# Patient Record
Sex: FEMALE | Race: BLACK OR AFRICAN AMERICAN | NOT HISPANIC OR LATINO | Employment: FULL TIME | URBAN - METROPOLITAN AREA
[De-identification: names, ages, dates, MRNs, and addresses within clinical notes are randomized per-mention and may not be internally consistent; named-entity substitution may affect disease eponyms.]

---

## 2021-07-11 ENCOUNTER — HOSPITAL ENCOUNTER (EMERGENCY)
Facility: HOSPITAL | Age: 26
Discharge: HOME | End: 2021-07-11
Attending: EMERGENCY MEDICINE
Payer: COMMERCIAL

## 2021-07-11 VITALS
HEART RATE: 85 BPM | RESPIRATION RATE: 18 BRPM | OXYGEN SATURATION: 100 % | DIASTOLIC BLOOD PRESSURE: 70 MMHG | SYSTOLIC BLOOD PRESSURE: 122 MMHG | TEMPERATURE: 99 F | HEIGHT: 63 IN | WEIGHT: 156 LBS | BODY MASS INDEX: 27.64 KG/M2

## 2021-07-11 DIAGNOSIS — S62.92XA CLOSED FRACTURE OF LEFT HAND, INITIAL ENCOUNTER: ICD-10-CM

## 2021-07-11 DIAGNOSIS — S05.11XA PERIORBITAL CONTUSION OF RIGHT EYE, INITIAL ENCOUNTER: ICD-10-CM
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
## 2021-07-11 DIAGNOSIS — S00.83XA CONTUSION OF FOREHEAD, INITIAL ENCOUNTER: ICD-10-CM

## 2021-07-11 DIAGNOSIS — M54.2 NECK PAIN: ICD-10-CM

## 2021-07-11 LAB
ALBUMIN SERPL BCP-MCNC: 4.4 G/DL (ref 3.5–5.2)
ALP SERPL-CCNC: 60 U/L (ref 55–135)
ALT SERPL W/O P-5'-P-CCNC: 51 U/L (ref 10–44)
ANION GAP SERPL CALC-SCNC: 13 MMOL/L (ref 8–16)
ANION GAP SERPL CALC-SCNC: 22 MMOL/L (ref 8–16)
AST SERPL-CCNC: 79 U/L (ref 10–40)
B-HCG UR QL: NEGATIVE
BASOPHILS # BLD AUTO: 0.06 K/UL (ref 0–0.2)
BASOPHILS NFR BLD: 0.5 % (ref 0–1.9)
BILIRUB SERPL-MCNC: 1.4 MG/DL (ref 0.1–1)
BUN SERPL-MCNC: 7 MG/DL (ref 6–30)
BUN SERPL-MCNC: 8 MG/DL (ref 6–20)
CALCIUM SERPL-MCNC: 9.4 MG/DL (ref 8.7–10.5)
CHLORIDE SERPL-SCNC: 102 MMOL/L (ref 95–110)
CHLORIDE SERPL-SCNC: 106 MMOL/L (ref 95–110)
CO2 SERPL-SCNC: 23 MMOL/L (ref 23–29)
CREAT SERPL-MCNC: 0.9 MG/DL (ref 0.5–1.4)
CREAT SERPL-MCNC: 1.1 MG/DL (ref 0.5–1.4)
CTP QC/QA: YES
DIFFERENTIAL METHOD: ABNORMAL
EOSINOPHIL # BLD AUTO: 0.1 K/UL (ref 0–0.5)
EOSINOPHIL NFR BLD: 0.4 % (ref 0–8)
ERYTHROCYTE [DISTWIDTH] IN BLOOD BY AUTOMATED COUNT: 13.3 % (ref 11.5–14.5)
EST. GFR  (AFRICAN AMERICAN): >60 ML/MIN/1.73 M^2
EST. GFR  (NON AFRICAN AMERICAN): >60 ML/MIN/1.73 M^2
GLUCOSE SERPL-MCNC: 89 MG/DL (ref 70–110)
GLUCOSE SERPL-MCNC: 92 MG/DL (ref 70–110)
HCT VFR BLD AUTO: 41.6 % (ref 37–48.5)
HCT VFR BLD CALC: 45 %PCV (ref 36–54)
HGB BLD-MCNC: 13.8 G/DL (ref 12–16)
IMM GRANULOCYTES # BLD AUTO: 0.04 K/UL (ref 0–0.04)
IMM GRANULOCYTES NFR BLD AUTO: 0.3 % (ref 0–0.5)
LYMPHOCYTES # BLD AUTO: 1.2 K/UL (ref 1–4.8)
LYMPHOCYTES NFR BLD: 9.3 % (ref 18–48)
MCH RBC QN AUTO: 30.1 PG (ref 27–31)
MCHC RBC AUTO-ENTMCNC: 33.2 G/DL (ref 32–36)
MCV RBC AUTO: 91 FL (ref 82–98)
MONOCYTES # BLD AUTO: 0.6 K/UL (ref 0.3–1)
MONOCYTES NFR BLD: 5 % (ref 4–15)
NEUTROPHILS # BLD AUTO: 10.6 K/UL (ref 1.8–7.7)
NEUTROPHILS NFR BLD: 84.5 % (ref 38–73)
NRBC BLD-RTO: 0 /100 WBC
PLATELET # BLD AUTO: 206 K/UL (ref 150–450)
PMV BLD AUTO: 11.5 FL (ref 9.2–12.9)
POC IONIZED CALCIUM: 1.12 MMOL/L (ref 1.06–1.42)
POC TCO2 (MEASURED): 24 MMOL/L (ref 23–29)
POTASSIUM BLD-SCNC: 3.3 MMOL/L (ref 3.5–5.1)
POTASSIUM SERPL-SCNC: 3.3 MMOL/L (ref 3.5–5.1)
PROT SERPL-MCNC: 8 G/DL (ref 6–8.4)
RBC # BLD AUTO: 4.58 M/UL (ref 4–5.4)
SAMPLE: ABNORMAL
SODIUM BLD-SCNC: 145 MMOL/L (ref 136–145)
SODIUM SERPL-SCNC: 142 MMOL/L (ref 136–145)
WBC # BLD AUTO: 12.49 K/UL (ref 3.9–12.7)

## 2021-07-11 PROCEDURE — 85025 COMPLETE CBC W/AUTO DIFF WBC: CPT | Performed by: PHYSICIAN ASSISTANT

## 2021-07-11 PROCEDURE — 90471 IMMUNIZATION ADMIN: CPT | Performed by: PHYSICIAN ASSISTANT

## 2021-07-11 PROCEDURE — 90715 TDAP VACCINE 7 YRS/> IM: CPT | Performed by: PHYSICIAN ASSISTANT

## 2021-07-11 PROCEDURE — 99900035 HC TECH TIME PER 15 MIN (STAT)

## 2021-07-11 PROCEDURE — 85014 HEMATOCRIT: CPT

## 2021-07-11 PROCEDURE — 80053 COMPREHEN METABOLIC PANEL: CPT | Performed by: PHYSICIAN ASSISTANT

## 2021-07-11 PROCEDURE — 25500020 PHARM REV CODE 255: Performed by: EMERGENCY MEDICINE

## 2021-07-11 PROCEDURE — 99285 EMERGENCY DEPT VISIT HI MDM: CPT | Mod: 25

## 2021-07-11 PROCEDURE — 63600175 PHARM REV CODE 636 W HCPCS: Performed by: PHYSICIAN ASSISTANT

## 2021-07-11 PROCEDURE — 84132 ASSAY OF SERUM POTASSIUM: CPT | Mod: 91

## 2021-07-11 PROCEDURE — 84295 ASSAY OF SERUM SODIUM: CPT

## 2021-07-11 PROCEDURE — 25000003 PHARM REV CODE 250: Performed by: EMERGENCY MEDICINE

## 2021-07-11 PROCEDURE — 25000003 PHARM REV CODE 250: Performed by: PHYSICIAN ASSISTANT

## 2021-07-11 PROCEDURE — 29125 APPL SHORT ARM SPLINT STATIC: CPT | Mod: LT

## 2021-07-11 PROCEDURE — 81025 URINE PREGNANCY TEST: CPT | Performed by: PHYSICIAN ASSISTANT

## 2021-07-11 PROCEDURE — 82565 ASSAY OF CREATININE: CPT

## 2021-07-11 PROCEDURE — 82330 ASSAY OF CALCIUM: CPT

## 2021-07-11 RX ORDER — HYDROCODONE BITARTRATE AND ACETAMINOPHEN 5; 325 MG/1; MG/1
1 TABLET ORAL EVERY 4 HOURS PRN
Qty: 12 TABLET | Refills: 0 | Status: SHIPPED | OUTPATIENT
Start: 2021-07-11 | End: 2021-07-21

## 2021-07-11 RX ORDER — OXYCODONE AND ACETAMINOPHEN 5; 325 MG/1; MG/1
1 TABLET ORAL
Status: COMPLETED | OUTPATIENT
Start: 2021-07-11 | End: 2021-07-11

## 2021-07-11 RX ADMIN — BACITRACIN ZINC, NEOMYCIN, POLYMYXIN B SULFAT 1 EACH: 5000; 3.5; 4 OINTMENT TOPICAL at 04:07

## 2021-07-11 RX ADMIN — IOHEXOL 75 ML: 350 INJECTION, SOLUTION INTRAVENOUS at 05:07

## 2021-07-11 RX ADMIN — TETANUS TOXOID, REDUCED DIPHTHERIA TOXOID AND ACELLULAR PERTUSSIS VACCINE, ADSORBED 0.5 ML: 5; 2.5; 8; 8; 2.5 SUSPENSION INTRAMUSCULAR at 04:07

## 2021-07-11 RX ADMIN — OXYCODONE HYDROCHLORIDE AND ACETAMINOPHEN 1 TABLET: 5; 325 TABLET ORAL at 08:07

## 2025-04-07 PROCEDURE — 81025 URINE PREGNANCY TEST: CPT | Performed by: PHYSICIAN ASSISTANT

## 2025-04-07 PROCEDURE — 99284 EMERGENCY DEPT VISIT MOD MDM: CPT

## 2025-04-08 ENCOUNTER — APPOINTMENT (OUTPATIENT)
Dept: RADIOLOGY | Facility: HOSPITAL | Age: 30
End: 2025-04-08
Payer: COMMERCIAL

## 2025-04-08 ENCOUNTER — HOSPITAL ENCOUNTER (EMERGENCY)
Facility: HOSPITAL | Age: 30
Discharge: HOME | End: 2025-04-08
Attending: EMERGENCY MEDICINE
Payer: COMMERCIAL

## 2025-04-08 VITALS
BODY MASS INDEX: 25.69 KG/M2 | SYSTOLIC BLOOD PRESSURE: 142 MMHG | OXYGEN SATURATION: 98 % | HEART RATE: 66 BPM | HEIGHT: 63 IN | RESPIRATION RATE: 14 BRPM | DIASTOLIC BLOOD PRESSURE: 68 MMHG | WEIGHT: 145 LBS | TEMPERATURE: 98 F

## 2025-04-08 DIAGNOSIS — T14.8XXA CRUSH INJURY: Primary | ICD-10-CM

## 2025-04-08 DIAGNOSIS — M79.652 LEFT THIGH PAIN: ICD-10-CM

## 2025-04-08 DIAGNOSIS — M79.661 PAIN IN RIGHT LOWER LEG: ICD-10-CM

## 2025-04-08 LAB
B-HCG UR QL: NEGATIVE
CTP QC/QA: YES

## 2025-04-08 PROCEDURE — 25000003 PHARM REV CODE 250: Performed by: PHYSICIAN ASSISTANT

## 2025-04-08 PROCEDURE — 73552 X-RAY EXAM OF FEMUR 2/>: CPT | Mod: 26,LT,, | Performed by: RADIOLOGY

## 2025-04-08 PROCEDURE — 73552 X-RAY EXAM OF FEMUR 2/>: CPT | Mod: TC,FY,LT

## 2025-04-08 RX ORDER — IBUPROFEN 600 MG/1
600 TABLET ORAL
Status: COMPLETED | OUTPATIENT
Start: 2025-04-08 | End: 2025-04-08

## 2025-04-08 RX ORDER — ACETAMINOPHEN 325 MG/1
650 TABLET ORAL
Status: COMPLETED | OUTPATIENT
Start: 2025-04-08 | End: 2025-04-08

## 2025-04-08 RX ORDER — METHOCARBAMOL 750 MG/1
1500 TABLET, FILM COATED ORAL 3 TIMES DAILY PRN
Qty: 30 TABLET | Refills: 0 | Status: SHIPPED | OUTPATIENT
Start: 2025-04-08 | End: 2025-04-13

## 2025-04-08 RX ADMIN — ACETAMINOPHEN 650 MG: 325 TABLET ORAL at 12:04

## 2025-04-08 RX ADMIN — IBUPROFEN 600 MG: 600 TABLET, FILM COATED ORAL at 12:04

## 2025-04-08 NOTE — DISCHARGE INSTRUCTIONS
Tylenol, ibuprofen as needed for pain.  Robaxin for continued stiffness/soreness. Be aware, this medication is sedating.  Do not mix with alcohol or any other sedating medications.  Do not drive or operate machinery when taking this medication.  Ice, compression, elevation to help with any swelling and discomfort.  Apply Neosporin or similar topical antibiotics to the abrasion, to prevent infection.    Follow up with a primary care provider for any continued aches and pains.    Return to this ED if worsening pain, if you develop swelling to your thigh or lower leg, if you develop generalized body aches and pains as well as dark or discolored urine, if unable to walk or bear weight, if no improvement with current plan, if any other problems occur.
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
## 2025-04-08 NOTE — Clinical Note
"Tiara Duran" Maci was seen and treated in our emergency department on 4/7/2025.  She may return to work on 04/10/2025.       If you have any questions or concerns, please don't hesitate to call.      Cesar Baca NRP     "